# Patient Record
(demographics unavailable — no encounter records)

---

## 2024-11-26 NOTE — DISCUSSION/SUMMARY
[de-identified] : The underlying pathophysiology was reviewed with the patient. XR films were reviewed with the patient. Discussed at length the nature of the patients condition. Their left shoulder symptoms appear secondary to mildly displaced proximal humerus shaft fracture. The patient and I discussed his options regarding treatment.   Patient was advised to take OTC medications and topical analgesic for pain management. He should continue to use his humerus brace. Bacitracin was applied over the incision sites. A sling was applied today in office. Gentle range of motion, stretching, and strengthening exercises were encouraged. Patient should actively work on gradually increasing use of the shoulder, as tolerated.   All questions answered, understanding verbalized. Patient in agreement with plan of care. All of the patient's forms were filled out. He should return to rehabilitation therapy.   Follow up in 4 weeks.

## 2024-11-26 NOTE — DISCUSSION/SUMMARY
[de-identified] : The underlying pathophysiology was reviewed with the patient. XR films were reviewed with the patient. Discussed at length the nature of the patients condition. Their left shoulder symptoms appear secondary to mildly displaced proximal humerus shaft fracture. The patient and I discussed his options regarding treatment.   Patient was advised to take OTC medications and topical analgesic for pain management. He should continue to use his humerus brace. Bacitracin was applied over the incision sites. A sling was applied today in office. Gentle range of motion, stretching, and strengthening exercises were encouraged. Patient should actively work on gradually increasing use of the shoulder, as tolerated.   All questions answered, understanding verbalized. Patient in agreement with plan of care. All of the patient's forms were filled out. He should return to rehabilitation therapy.   Follow up in 4 weeks.

## 2024-11-26 NOTE — HISTORY OF PRESENT ILLNESS
[de-identified] : 87 year old RHD gentleman presents for evaluation of a left shoulder injury sustained on 11/9/24 after a [place] the [place] He was taken by ambulance and was brought to [facility]. He was placed in a Marquez brace and is currently at [facility].

## 2024-11-26 NOTE — ADDENDUM
[FreeTextEntry1] : I, Josh Rios Jr, acted solely as a scribe for Dr. Christofer Giron on this date 11/26/2024  All medical record entries made by the Scribe were at my, Dr. Christofer Giron, direction and personally dictated by me on 11/26/2024 . I have reviewed the chart and agree that the record accurately reflects my personal performance of the history, physical exam, assessment and plan. I have also personally directed, reviewed, and agreed with the chart.

## 2024-11-26 NOTE — HISTORY OF PRESENT ILLNESS
[de-identified] : 87 year old RHD gentleman presents for evaluation of a left shoulder injury sustained on 11/9/24 after a fall onto the [place] the [place]. He was taken by ambulance and was brought to [facility]. He was placed in a Marquez brace and is currently at [facility].

## 2024-11-26 NOTE — PHYSICAL EXAM
[de-identified] : Patient is WDWN, alert, and in no acute distress. Breathing is unlabored. He is grossly oriented to person, place, and time.   Left Shoulder: Inspection/ Palpation:  Humeral brace is removed. There is tenderness to palpation over the humerus. Moderate edema noted. Ecchymosis. Circumferential blisters are noted.  Range of Motion: 0-90  Strength: forward elevation, internal rotation, external rotation, adduction, and abduction are 5/5.  Stability: no joint instability on provocative testing. [de-identified] : AP, lateral and oblique views of the LEFT shoulder were obtained today and revealed mildly displaced spiral humerus shaft fracture.

## 2024-11-26 NOTE — PHYSICAL EXAM
[de-identified] : Patient is WDWN, alert, and in no acute distress. Breathing is unlabored. He is grossly oriented to person, place, and time.   Left Shoulder: Inspection/ Palpation:  Humeral brace is removed. There is tenderness to palpation over the humerus. Moderate edema noted. Ecchymosis. Circumferential blisters are noted.  Range of Motion: 0-90  Strength: forward elevation, internal rotation, external rotation, adduction, and abduction are 5/5.  Stability: no joint instability on provocative testing. [de-identified] : AP, lateral and oblique views of the LEFT shoulder were obtained today and revealed mildly displaced spiral humerus shaft fracture.

## 2024-12-10 NOTE — PHYSICAL EXAM
[de-identified] : Patient is WDWN, alert, and in no acute distress. Breathing is unlabored. He is grossly oriented to person, place, and time.  His wife and aide are present Left Shoulder: Inspection/ Palpation:  Humeral brace is removed. There is tenderness to palpation over the humerus. Moderate edema noted.. Circumferential blisters are healed.  Range of Motion: elbow 10-90 .  Stability: no joint instability on provocative testing. [de-identified] : AP, lateral and oblique views of the LEFT shoulder were obtained today and revealed partially healed spiral well aligned humeral shaft fracture.

## 2024-12-10 NOTE — ADDENDUM
[FreeTextEntry1] : I, Josh Rios Jr, acted solely as a scribe for Dr. Christofer Giron on this date 12/10/2024  All medical record entries made by the Scribe were at my, Dr. Christofer Giron, direction and personally dictated by me on 12/10/2024 . I have reviewed the chart and agree that the record accurately reflects my personal performance of the history, physical exam, assessment and plan. I have also personally directed, reviewed, and agreed with the chart.

## 2024-12-10 NOTE — DISCUSSION/SUMMARY
[de-identified] : The underlying pathophysiology was reviewed with the patient. XR films were reviewed with the patient. Discussed at length the nature of the patients condition. His left shoulder symptoms are secondary to mildly displaced proximal humerus shaft fracture. I informed the patient's wife that the fracture has partially healed. The patient has noticed gradual improvement regarding his symptoms.  Patient was advised to take OTC medications and topical analgesic for pain management. He should continue to use his humerus brace. Bacitracin was applied over the incision sites. ACE bandage was applied today in office. Gentle range of motion, stretching, and strengthening exercises were encouraged. Patient should actively work on gradually increasing use of the shoulder, as tolerated.   All questions answered, understanding verbalized. Patient in agreement with plan of care. All of the patient's forms were filled out. He should return to rehabilitation therapy.   Follow up in 4 weeks.

## 2024-12-10 NOTE — HISTORY OF PRESENT ILLNESS
[de-identified] : 87 year old RHD gentleman presents for evaluation of a left shoulder injury sustained on 11/9/24 after a fall onto the [place] the [place]. He was taken by ambulance and was brought to [facility]. He was placed in a Marquez brace and is currently at [facility].   Today, Dec 10, 2024, the patient presents for follow-up and further management. He has noticed gradual improvement regarding his humerus fracture. He continues to wear a humerus brace.

## 2025-01-07 NOTE — DISCUSSION/SUMMARY
[de-identified] : The underlying pathophysiology was reviewed with the patient. Discussed at length the nature of the patients condition. His left shoulder symptoms are secondary to mildly displaced proximal humerus shaft fracture. I informed the patient's wife that the fracture has partially healed. The patient is progressing well.   Patient was advised to take OTC medications and topical analgesic for pain management. He should continue to use his humerus brace. Gentle range of motion, stretching, and strengthening exercises were encouraged. Patient should actively work on gradually increasing use of the shoulder, as tolerated.   The patient can continue with physical therapy. I want him to focus on improving ROM.  All questions answered, understanding verbalized. Patient in agreement with plan of care. All of the patient's forms were filled out. He should return to rehabilitation therapy.   Follow up in 6 weeks for new x-rays

## 2025-01-07 NOTE — HISTORY OF PRESENT ILLNESS
[de-identified] : 87 year old RHD gentleman presents for evaluation of a left shoulder injury sustained on 11/9/24 after a fall onto the left [place] the [place]. He was taken by ambulance and was brought to [facility]. He was placed in a Marquez brace and is currently at [facility].   Today, Jan 07, 2024, the patient presents for follow-up with his wife/ and further management. His symptoms have improved since last visit and has been improving his ROM. He presents today wearing a humoral brace.

## 2025-01-07 NOTE — ADDENDUM
[FreeTextEntry1] : I, Vinicio Ramirez wrote this note acting as a scribe for Dr. Christofer Giron on 01/07/2025.  All medical record entries made by the Scribe were at my, Dr. Christofer Giron MD., direction and personally dictated by me on 01/07/2025. I have personally reviewed the chart and agree that the record accurately reflects my personal performance of the history, physical exam, assessment and plan

## 2025-01-07 NOTE — DISCUSSION/SUMMARY
[de-identified] : The underlying pathophysiology was reviewed with the patient. Discussed at length the nature of the patients condition. His left shoulder symptoms are secondary to mildly displaced proximal humerus shaft fracture. I informed the patient's wife that the fracture has partially healed. The patient is progressing well.   Patient was advised to take OTC medications and topical analgesic for pain management. He should continue to use his humerus brace. Gentle range of motion, stretching, and strengthening exercises were encouraged. Patient should actively work on gradually increasing use of the shoulder, as tolerated.   The patient can continue with physical therapy. I want him to focus on improving ROM.  All questions answered, understanding verbalized. Patient in agreement with plan of care. All of the patient's forms were filled out. He should return to rehabilitation therapy.   Follow up in 6 weeks for new x-rays

## 2025-01-07 NOTE — PHYSICAL EXAM
[de-identified] : Patient is WDWN, alert, and in no acute distress. Breathing is unlabored. He is grossly oriented to person, place, and time.  His wife is present. He presents today wearing a humoral brace. Left Shoulder: Inspection/ Palpation:  There is tenderness to palpation over the humerus. Moderate edema noted.. Circumferential blisters are healed.  Range of Motion: 70 degrees flexion.   Stability: no joint instability on provocative testing. [de-identified] : AP, lateral and oblique views of the LEFT humerus were obtained today and revealed partially healed spiral well aligned humeral shaft fracture.

## 2025-01-07 NOTE — PHYSICAL EXAM
[de-identified] : Patient is WDWN, alert, and in no acute distress. Breathing is unlabored. He is grossly oriented to person, place, and time.  His wife is present. He presents today wearing a humoral brace. Left Shoulder: Inspection/ Palpation:  There is tenderness to palpation over the humerus. Moderate edema noted.. Circumferential blisters are healed.  Range of Motion: 70 degrees flexion.   Stability: no joint instability on provocative testing. [de-identified] : AP, lateral and oblique views of the LEFT humerus were obtained today and revealed partially healed spiral well aligned humeral shaft fracture.

## 2025-01-07 NOTE — HISTORY OF PRESENT ILLNESS
[de-identified] : 87 year old RHD gentleman presents for evaluation of a left shoulder injury sustained on 11/9/24 after a fall onto the left [place] the [place]. He was taken by ambulance and was brought to [facility]. He was placed in a Marquez brace and is currently at [facility].   Today, Jan 07, 2024, the patient presents for follow-up with his wife/ and further management. His symptoms have improved since last visit and has been improving his ROM. He presents today wearing a humoral brace.

## 2025-02-13 NOTE — DISCUSSION/SUMMARY
[de-identified] : The underlying pathophysiology was reviewed with the patient. Discussed at length the nature of the patients condition. His left shoulder symptoms are secondary to mildly displaced proximal humerus shaft fracture. I informed the patient's wife that the fracture has partially healed. The patient is progressing well.   Patient was advised to take OTC medications and topical analgesic for pain management. He should continue to use his humerus brace. Gentle range of motion, stretching, and strengthening exercises were encouraged. Patient should actively work on gradually increasing use of the shoulder, as tolerated.   The patient can continue with physical therapy. I want him to focus on improving ROM. I do want him to ween off the shoulder sling.  All questions answered, understanding verbalized. Patient in agreement with plan of care. All of the patient's forms were filled out. He should return to rehabilitation therapy.    All questions answered, understanding verbalized. Patient in agreement with plan of care. The patient can follow-up in 6 weeks. If the patient begins to experience any changes or severe exacerbation of his symptoms, he should reach out to me as soon as possible.

## 2025-02-13 NOTE — ADDENDUM
[FreeTextEntry1] : I, Vinicio Ramirez wrote this note acting as a scribe for Dr. Christofer Giron on 02/13/2025.   All medical record entries made by the Scribe were at my, Dr. Christofer Giron M.D., direction and personally dictated by me on 02/13/2025. I have personally reviewed the chart and agree that the record accurately reflects my personal performance of the history, physical exam, assessment and plan

## 2025-02-13 NOTE — HISTORY OF PRESENT ILLNESS
[de-identified] : 87 year old RHD gentleman presents for evaluation of a left shoulder injury sustained on 11/9/24 after a fall onto the left [place] the [place]. He was taken by ambulance and was brought to [facility]. He was placed in a Marquez brace and is currently at [facility].   Today, Feb 13, 2025, the patient presents for follow-up with his wife/ and further management. His symptoms have improved since last visit and has been improving his ROM. He presents today wearing a shoulder sling.

## 2025-02-13 NOTE — PHYSICAL EXAM
[de-identified] : Patient is WDWN, alert, and in no acute distress. Breathing is unlabored. He is grossly oriented to person, place, and time.  His wife is present. He presents today wearing a humoral brace. Left Shoulder: Inspection/ Palpation:  There is tenderness to palpation over the humerus. Moderate edema noted.. Circumferential blisters are healed.  Shoulder Range of Motion: 80 degrees flexion.   Elbow ROM:  degrees. Stability: no joint instability on provocative testing. [de-identified] : AP, lateral and oblique views of the LEFT humerus were obtained today and revealed partially healed spiral well aligned humeral shaft fracture.

## 2025-03-11 NOTE — PHYSICAL EXAM
[Normal] : normal gait [Edema ___] : edema [unfilled] [Venous stasis] : venous stasis [Venous varicosities] : venous varicosities [de-identified] : Irregular rhythm

## 2025-03-11 NOTE — REASON FOR VISIT
[Arrhythmia/ECG Abnorrmalities] : arrhythmia/ECG abnormalities [Hypertension] : hypertension [Coronary Artery Disease] : coronary artery disease [FreeTextEntry3] : Jose

## 2025-03-11 NOTE — DISCUSSION/SUMMARY
[Patient] : the patient [Risks] : risks [Benefits] : benefits [Alternatives] : alternatives [___ Month(s)] : in [unfilled] month(s) [FreeTextEntry1] : Discussed with patient and his wife recommended echocardiography.  Cardiac monitor 3 days placed today call for results.  Reconsider diuretic when he follows up with PMD.  Use support or compression stockings.      [EKG obtained to assist in diagnosis and management of assessed problem(s)] : EKG obtained to assist in diagnosis and management of assessed problem(s)

## 2025-03-11 NOTE — ASSESSMENT
Rx faxed to   [facility]/pharmacy #[id number] - [place], [place] - [address]  [address]  [place] [address]  Phone: [phone] Fax: [phone]       [FreeTextEntry1] : 88-year-old man with chronic A. fib on oral anticoagulation.  Chronic ASHD without symptoms.  History of abdominal aneurysm resection followed by Dr. Cortes.  Status post COVID-19 infection Normal LV systolic performance biatrial enlargement mild pulmonary hypertension on echo .  Gait disorder Status post rehab following mechanical fall possible loss of consciousness post fall per wife and notes, conduction system disease.  Edema likely venous insufficiency now off diuretic

## 2025-03-11 NOTE — CARDIOLOGY SUMMARY
[de-identified] : January 6, 2023 atrial fibrillation, RBBB, moderate rate August 31, 2023 atrial fibrillation controlled ventricular sponsor RBBB September 5, 2024 atrial fibrillation controlled ventricular sponsor RBBB March 11, 2025 atrial fibrillation RBBB   [de-identified] : 2016 septal ischemia [de-identified] : 2018 normal LV function\par  January 2023 normal LV function biatrial enlargement [de-identified] : October 20 1100% occluded LAD 70% obtuse marginal, 70% right coronary

## 2025-05-13 NOTE — ADDENDUM
[FreeTextEntry1] : I, Kristina Liriano wrote this note acting as a scribe for Dr. Christofer Giron on 05/13/2025.   All medical record entries made by the Scribe were at my, Dr. Christofer Giron MD., direction and personally dictated by me on 05/13/2025. I have personally reviewed the chart and agree that the record accurately reflects my personal performance of the history, physical exam, assessment and plan.

## 2025-05-13 NOTE — PHYSICAL EXAM
[de-identified] : Patient is WDWN, alert, and in no acute distress. Breathing is unlabored. He is grossly oriented to person, place, and time.  His wife is present and translating.   Left Shoulder: Inspection/ Palpation:  There is minimal  tenderness over the humerus. Moderate edema noted..  Shoulder Range of Motion: 95 degrees flexion; improved from last visit Elbow ROM:  degrees. Stability: no joint instability on provocative testing. [de-identified] : AP, lateral and oblique views of the LEFT humerus were obtained today and revealed partially healed spiral well aligned humeral shaft fracture. Fracture is about 85% healed.

## 2025-05-13 NOTE — DISCUSSION/SUMMARY
[de-identified] : The underlying pathophysiology was reviewed with the patient. Discussed at length the nature of the patients condition. His left shoulder symptoms are secondary to mildly displaced proximal humerus shaft fracture. I informed the patient's wife that the fracture has partially healed. The patient is progressing well.   I reassured the patient that his residual symptoms are within the nature of his fracture and is healing well. It is 85% healed. It can take a year for maximum recovery.  Patient was advised to try OTC medication and topical analgesics for pain management. I recommend that the patient utilize Tylenol, Advil, Aleve, Voltaren gel, Icy Hot, Biofreeze, Bengay, or 4% lidocaine patches.  Gentle range of motion, stretching, and strengthening exercises were encouraged. Increase activity as tolerated.   The patient was encouraged to take Calcium Citrate, Vitamin D3 and Vitamin C along with a high calcium diet is advised to promote bone health and healing.  All questions answered, understanding verbalized. Patient in agreement with plan of care. The patient can follow-up in 4 months or as needed. If the patient begins to experience any changes or severe exacerbation of his symptoms, he should reach out to me as soon as possible.

## 2025-05-13 NOTE — HISTORY OF PRESENT ILLNESS
[de-identified] : 87 year old RHD gentleman presents for evaluation of a left shoulder injury sustained on 11/9/24 after a fall onto the [place]. He was taken by ambulance and was brought to [facility]. He was placed in a Marquez brace and is currently at [facility].  He was treated nonsurgically with a humeral brace and sling. Today, 05/13/2025, the patient presents for a follow-up evaluation and further care. He is here with his wife . He stopped using the brace annd sling. He is not taking pain medication. He is doing home exercises. He uses a walker.

## 2025-07-08 NOTE — PHYSICAL EXAM
[Normal] : normal gait [Edema ___] : edema [unfilled] [Venous stasis] : venous stasis [Venous varicosities] : venous varicosities [de-identified] : Irregular rhythm

## 2025-07-08 NOTE — REASON FOR VISIT
[Arrhythmia/ECG Abnorrmalities] : arrhythmia/ECG abnormalities [Hypertension] : hypertension [Coronary Artery Disease] : coronary artery disease [Spouse] : spouse [FreeTextEntry3] : Jose

## 2025-07-08 NOTE — DISCUSSION/SUMMARY
[Patient] : the patient [Risks] : risks [Benefits] : benefits [Alternatives] : alternatives [___ Month(s)] : in [unfilled] month(s) [FreeTextEntry1] : Follow-up routinely with PMD Dr. Garcia see me again in 6 months or earlier as needed discussed echo findings with patient and wife      [EKG obtained to assist in diagnosis and management of assessed problem(s)] : EKG obtained to assist in diagnosis and management of assessed problem(s)

## 2025-07-08 NOTE — HISTORY OF PRESENT ILLNESS
[FreeTextEntry1] : Patient with history of atrial fibrillation, CAD and hypertension.    He follows with Dr. Sebastian buckley regarding his AAA and prior surgery  Status post fall and humerus fracture.  Is walking with a walker.  Denies chest pain dyspnea palpitations

## 2025-07-08 NOTE — ASSESSMENT
[FreeTextEntry1] : 88-year-old man with chronic A. fib on oral anticoagulation.  Chronic ASHD without symptoms.  History of abdominal aneurysm resection followed by Dr. Cortes.   Normal LV systolic performance biatrial enlargement by echo.  No current cardiac symptoms

## 2025-07-08 NOTE — CARDIOLOGY SUMMARY
[de-identified] : January 6, 2023 atrial fibrillation, RBBB, moderate rate August 31, 2023 atrial fibrillation controlled ventricular sponsor RBBB September 5, 2024 atrial fibrillation controlled ventricular sponsor RBBB July 8, 2025 atrial fibrillation controlled ventricular response RBBB March 11, 2025 atrial fibrillation RBBB   [de-identified] : 2016 septal ischemia [de-identified] : 2018 normal LV function January 2023 normal LV function biatrial enlargement July 2025 normal LVEF biatrial enlargement [de-identified] : October 20 1100% occluded LAD 70% obtuse marginal, 70% right coronary